# Patient Record
(demographics unavailable — no encounter records)

---

## 2025-05-02 NOTE — IMAGING
[de-identified] : Physical exam of the left second toe: There is edema noted proximally.  There is hyperpigmentation of the skin of the proximal phalanx of the second toe.  Tenderness to palpation over the proximal phalanx.  Nontender to palpation over the middle phalanx or distal phalanx of the toe.  Intact to light touch, mild antalgic gait.

## 2025-05-02 NOTE — DATA REVIEWED
[Left] : left [Foot] : foot [FreeTextEntry1] : 3 views of the left foot were obtained in the office today and show: A displaced fracture of the distal aspect of the proximal phalanx and subluxation at the proximal interphalangeal joint of the second toe.

## 2025-05-02 NOTE — HISTORY OF PRESENT ILLNESS
[de-identified] : The patient is a 55-year-old female accompanied by her daughter here for an evaluation of her left foot.  2 months ago while changing a mattress she hit the left foot on a bed frame injuring the left second toe.  They have been chasidy taping the toe and the patient still relates that the second toe seems straighter after chasidy taping.  She is having pain on a daily basis, it is not decreased in severity.  She is a diabetic.

## 2025-06-18 NOTE — DISCUSSION/SUMMARY
[de-identified] : I had a thorough discussion with the patient and her daughter regarding her fracture.  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  I will see her back on as-needed basis.  All questions answered.

## 2025-06-18 NOTE — HISTORY OF PRESENT ILLNESS
[de-identified] : 56-year-old patient here to see me in regards to follow-up of her left second toe fracture.  She is doing better.  She really describes minimal pain.

## 2025-06-18 NOTE — PHYSICAL EXAM
[de-identified] : There is stiffness and tenderness at the second PIP joint.  There has been no interval resolution of her fracture or improvements or worsening of alignment.

## 2025-06-18 NOTE — DATA REVIEWED
[FreeTextEntry1] : 2 views of the patient's left second toe ordered reviewed by me personally.  X-rays demonstrate a persistently displaced articular fracture of the left second proximal phalanx.